# Patient Record
Sex: FEMALE | Race: WHITE | NOT HISPANIC OR LATINO | URBAN - METROPOLITAN AREA
[De-identification: names, ages, dates, MRNs, and addresses within clinical notes are randomized per-mention and may not be internally consistent; named-entity substitution may affect disease eponyms.]

---

## 2021-12-06 ENCOUNTER — TELEPHONE ENCOUNTER (OUTPATIENT)
Dept: URBAN - METROPOLITAN AREA CLINIC 113 | Facility: CLINIC | Age: 31
End: 2021-12-06

## 2021-12-27 ENCOUNTER — OFFICE VISIT (OUTPATIENT)
Dept: URBAN - METROPOLITAN AREA CLINIC 113 | Facility: CLINIC | Age: 31
End: 2021-12-27
Payer: COMMERCIAL

## 2021-12-27 ENCOUNTER — WEB ENCOUNTER (OUTPATIENT)
Dept: URBAN - METROPOLITAN AREA CLINIC 113 | Facility: CLINIC | Age: 31
End: 2021-12-27

## 2021-12-27 VITALS
WEIGHT: 125 LBS | TEMPERATURE: 98 F | HEIGHT: 64 IN | HEART RATE: 96 BPM | SYSTOLIC BLOOD PRESSURE: 114 MMHG | DIASTOLIC BLOOD PRESSURE: 77 MMHG | BODY MASS INDEX: 21.34 KG/M2

## 2021-12-27 DIAGNOSIS — B17.10 HEPATITIS C: ICD-10-CM

## 2021-12-27 PROCEDURE — 99243 OFF/OP CNSLTJ NEW/EST LOW 30: CPT | Performed by: NURSE PRACTITIONER

## 2021-12-27 PROCEDURE — 99203 OFFICE O/P NEW LOW 30 MIN: CPT | Performed by: NURSE PRACTITIONER

## 2021-12-27 RX ORDER — PNV NO.95/FERROUS FUM/FOLIC AC 28MG-0.8MG
1 TABLET TABLET ORAL ONCE A DAY
Status: ACTIVE | COMMUNITY

## 2021-12-27 NOTE — HPI-TODAY'S VISIT:
31-year-old female referred by Dr. Nori Romero for evaluation of hepatitis C.  A copy of this document is being forwarded to the referring provider. She is currently 22 weeks pregnant and following with both general and high risk OB.  She was diagnosed with hepatitis C in 2016, but treatment was denied by her insurance at that time.  Per a review of her record via [facility] epic EMR, her hepatitis C antibody was positive with a detectable viral load, genotype 1a.  An abdominal ultrasound at that time showed a normal liver. Labs on 10/24/2021 show AST 26, ALT 37, ALP 51, T bili 0.2.  Detectable HCV RNA. She is asymptomatic from a GI standpoint, denying abdominal pain, nausea or vomiting.  Her bowel habits are regular and she denies blood per rectum.

## 2022-06-27 ENCOUNTER — OFFICE VISIT (OUTPATIENT)
Dept: URBAN - METROPOLITAN AREA CLINIC 107 | Facility: CLINIC | Age: 32
End: 2022-06-27
Payer: COMMERCIAL

## 2022-06-27 ENCOUNTER — TELEPHONE ENCOUNTER (OUTPATIENT)
Dept: URBAN - METROPOLITAN AREA CLINIC 113 | Facility: CLINIC | Age: 32
End: 2022-06-27

## 2022-06-27 VITALS
HEIGHT: 64 IN | DIASTOLIC BLOOD PRESSURE: 64 MMHG | HEART RATE: 70 BPM | BODY MASS INDEX: 19.97 KG/M2 | TEMPERATURE: 97.7 F | WEIGHT: 117 LBS | SYSTOLIC BLOOD PRESSURE: 101 MMHG

## 2022-06-27 DIAGNOSIS — B17.10 HEPATITIS C: ICD-10-CM

## 2022-06-27 PROCEDURE — 99214 OFFICE O/P EST MOD 30 MIN: CPT | Performed by: NURSE PRACTITIONER

## 2022-06-27 RX ORDER — VELPATASVIR AND SOFOSBUVIR 100; 400 MG/1; MG/1
1 TABLET TABLET, FILM COATED ORAL ONCE A DAY
Qty: 84 TABLET | Refills: 0 | OUTPATIENT
Start: 2022-06-27 | End: 2022-09-19

## 2022-06-27 RX ORDER — PNV NO.95/FERROUS FUM/FOLIC AC 28MG-0.8MG
1 TABLET TABLET ORAL ONCE A DAY
Status: ACTIVE | COMMUNITY

## 2022-06-27 NOTE — HPI-TODAY'S VISIT:
31-year-old female presenting for follow-up of hepatitis C. She was seen in the office in December 2021 for evaluation of treatment naive hepatitis C, genotype Ia.  She was 22 weeks pregnant at the time, so treatment was deferred to the postpartum period. She is currently two months postpartum, with a healthy baby girl. She is not breastfeeding, in hopes to begin hepatitis C treatment as soon as possible. She is without GI complaint. No abdominal pain, nausea or vomiting. Her bowel habits are regular and she denies blood per rectum.

## 2023-01-10 ENCOUNTER — OFFICE VISIT (OUTPATIENT)
Dept: URBAN - METROPOLITAN AREA CLINIC 113 | Facility: CLINIC | Age: 33
End: 2023-01-10

## 2023-01-20 ENCOUNTER — OFFICE VISIT (OUTPATIENT)
Dept: URBAN - METROPOLITAN AREA CLINIC 113 | Facility: CLINIC | Age: 33
End: 2023-01-20
Payer: COMMERCIAL

## 2023-01-20 VITALS
RESPIRATION RATE: 18 BRPM | DIASTOLIC BLOOD PRESSURE: 81 MMHG | HEART RATE: 102 BPM | TEMPERATURE: 97.3 F | SYSTOLIC BLOOD PRESSURE: 102 MMHG | HEIGHT: 64 IN | BODY MASS INDEX: 19.97 KG/M2 | WEIGHT: 117 LBS

## 2023-01-20 DIAGNOSIS — B17.10 HEPATITIS C: ICD-10-CM

## 2023-01-20 PROBLEM — 50711007 HEPATITIS C: Status: ACTIVE | Noted: 2021-12-27

## 2023-01-20 PROCEDURE — 99214 OFFICE O/P EST MOD 30 MIN: CPT | Performed by: NURSE PRACTITIONER

## 2023-01-20 RX ORDER — PNV NO.95/FERROUS FUM/FOLIC AC 28MG-0.8MG
1 TABLET TABLET ORAL ONCE A DAY
Status: ON HOLD | COMMUNITY

## 2023-01-20 NOTE — HPI-TODAY'S VISIT:
32-year-old female presenting for follow-up of hepatitis C.  She was seen in the office in June 2022 for follow-up regarding treatment naive hepatitis C. Labs and an abdominal ultrasound will be planned in anticipation for treatment. 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 Labs 6/27/22: H/H 13/40.1, MCV 81, Plt 367, WBC 7.47. AST 32, ALT 66, ALP 71, Tbili 0.3, CMP otherwise unremarkable. PT/INR 12.7/0.9. AFP 2.2. Detectable HCV viral load. She was recommended treatment with Epclusa, which she completed in the fall. She remains without abdominal complaint. No abdominal pain, nausea or vomiting. Her bowel habits are regular without blood per rectum.

## 2023-01-24 LAB
A/G RATIO: 1.8
ABSOLUTE BASOPHILS: 33
ABSOLUTE EOSINOPHILS: 57
ABSOLUTE LYMPHOCYTES: 2444
ABSOLUTE MONOCYTES: 484
ABSOLUTE NEUTROPHILS: 5182
ALBUMIN: 4.6
ALKALINE PHOSPHATASE: 45
ALT (SGPT): 13
AST (SGOT): 11
BASOPHILS: 0.4
BILIRUBIN, TOTAL: 0.3
BUN/CREATININE RATIO: (no result)
BUN: 8
CALCIUM: 9.2
CARBON DIOXIDE, TOTAL: 29
CHLORIDE: 107
CREATININE: 0.7
EGFR: 119
EOSINOPHILS: 0.7
GLOBULIN, TOTAL: 2.5
GLUCOSE: 95
HCV RNA, QUANTITATIVE: <1.18
HCV RNA, QUANTITATIVE: <15
HEMATOCRIT: 38.5
HEMOGLOBIN: 12.9
INR: 0.9
LYMPHOCYTES: 29.8
MCH: 27
MCHC: 33.5
MCV: 80.7
MONOCYTES: 5.9
MPV: 9.4
NEUTROPHILS: 63.2
PLATELET COUNT: 334
POTASSIUM: 4.2
PROTEIN, TOTAL: 7.1
PT: 9.8
RDW: 13.6
RED BLOOD CELL COUNT: 4.77
SODIUM: 141
WHITE BLOOD CELL COUNT: 8.2

## 2023-07-10 ENCOUNTER — DASHBOARD ENCOUNTERS (OUTPATIENT)
Age: 33
End: 2023-07-10

## 2023-07-10 ENCOUNTER — OFFICE VISIT (OUTPATIENT)
Dept: URBAN - METROPOLITAN AREA CLINIC 113 | Facility: CLINIC | Age: 33
End: 2023-07-10

## 2023-07-10 RX ORDER — PNV NO.95/FERROUS FUM/FOLIC AC 28MG-0.8MG
1 TABLET TABLET ORAL ONCE A DAY
Status: ON HOLD | COMMUNITY